# Patient Record
Sex: MALE | Race: WHITE
[De-identification: names, ages, dates, MRNs, and addresses within clinical notes are randomized per-mention and may not be internally consistent; named-entity substitution may affect disease eponyms.]

---

## 2017-03-03 ENCOUNTER — PRIOR ORIGINAL RECORDS (OUTPATIENT)
Dept: OTHER | Age: 62
End: 2017-03-03

## 2017-03-22 ENCOUNTER — MYAURORA ACCOUNT LINK (OUTPATIENT)
Dept: OTHER | Age: 62
End: 2017-03-22

## 2017-03-22 ENCOUNTER — PRIOR ORIGINAL RECORDS (OUTPATIENT)
Dept: OTHER | Age: 62
End: 2017-03-22

## 2017-05-18 PROCEDURE — 81001 URINALYSIS AUTO W/SCOPE: CPT | Performed by: FAMILY MEDICINE

## 2017-05-18 PROCEDURE — 84153 ASSAY OF PSA TOTAL: CPT | Performed by: FAMILY MEDICINE

## 2017-05-22 PROBLEM — M25.60 MORNING STIFFNESS OF JOINTS: Status: ACTIVE | Noted: 2017-05-22

## 2017-05-22 PROBLEM — R73.02 GLUCOSE INTOLERANCE (IMPAIRED GLUCOSE TOLERANCE): Status: ACTIVE | Noted: 2017-05-22

## 2017-05-22 PROCEDURE — 86200 CCP ANTIBODY: CPT | Performed by: FAMILY MEDICINE

## 2017-05-22 PROCEDURE — 86618 LYME DISEASE ANTIBODY: CPT | Performed by: FAMILY MEDICINE

## 2018-07-02 PROBLEM — M25.60 MORNING STIFFNESS OF JOINTS: Status: RESOLVED | Noted: 2017-05-22 | Resolved: 2018-07-02

## 2019-03-01 VITALS
BODY MASS INDEX: 33.6 KG/M2 | WEIGHT: 240 LBS | HEART RATE: 68 BPM | HEIGHT: 71 IN | DIASTOLIC BLOOD PRESSURE: 76 MMHG | SYSTOLIC BLOOD PRESSURE: 130 MMHG

## 2019-09-03 PROCEDURE — 81003 URINALYSIS AUTO W/O SCOPE: CPT | Performed by: FAMILY MEDICINE

## 2020-10-06 ENCOUNTER — APPOINTMENT (OUTPATIENT)
Dept: CT IMAGING | Facility: HOSPITAL | Age: 65
End: 2020-10-06
Attending: EMERGENCY MEDICINE
Payer: MEDICARE

## 2020-10-06 ENCOUNTER — HOSPITAL ENCOUNTER (EMERGENCY)
Facility: HOSPITAL | Age: 65
Discharge: HOME | End: 2020-10-06
Attending: EMERGENCY MEDICINE
Payer: MEDICARE

## 2020-10-06 VITALS
HEART RATE: 99 BPM | TEMPERATURE: 98 F | DIASTOLIC BLOOD PRESSURE: 71 MMHG | RESPIRATION RATE: 25 BRPM | WEIGHT: 247 LBS | OXYGEN SATURATION: 94 % | SYSTOLIC BLOOD PRESSURE: 110 MMHG | BODY MASS INDEX: 35 KG/M2

## 2020-10-06 DIAGNOSIS — R42 DIZZINESS: Primary | ICD-10-CM

## 2020-10-06 DIAGNOSIS — F10.920 ALCOHOLIC INTOXICATION WITHOUT COMPLICATION (HCC): ICD-10-CM

## 2020-10-06 PROCEDURE — 85610 PROTHROMBIN TIME: CPT | Performed by: EMERGENCY MEDICINE

## 2020-10-06 PROCEDURE — 80053 COMPREHEN METABOLIC PANEL: CPT | Performed by: EMERGENCY MEDICINE

## 2020-10-06 PROCEDURE — 96360 HYDRATION IV INFUSION INIT: CPT

## 2020-10-06 PROCEDURE — 85730 THROMBOPLASTIN TIME PARTIAL: CPT | Performed by: EMERGENCY MEDICINE

## 2020-10-06 PROCEDURE — 84484 ASSAY OF TROPONIN QUANT: CPT | Performed by: EMERGENCY MEDICINE

## 2020-10-06 PROCEDURE — 70450 CT HEAD/BRAIN W/O DYE: CPT | Performed by: EMERGENCY MEDICINE

## 2020-10-06 PROCEDURE — 99285 EMERGENCY DEPT VISIT HI MDM: CPT

## 2020-10-06 PROCEDURE — 80320 DRUG SCREEN QUANTALCOHOLS: CPT | Performed by: EMERGENCY MEDICINE

## 2020-10-06 PROCEDURE — 82962 GLUCOSE BLOOD TEST: CPT

## 2020-10-06 PROCEDURE — 93010 ELECTROCARDIOGRAM REPORT: CPT

## 2020-10-06 PROCEDURE — 85025 COMPLETE CBC W/AUTO DIFF WBC: CPT | Performed by: EMERGENCY MEDICINE

## 2020-10-06 PROCEDURE — 93005 ELECTROCARDIOGRAM TRACING: CPT

## 2020-10-07 NOTE — ED NOTES
Patient awake and alert in no distress resting comfortably in the stretcher. Patients SpO2 was observed to range from 88-92% on room air. Patient denies any shortness of breath or worsening of symptoms. Patient placed on 2 liters nasal cannula.  Dr Rocío Harrell

## 2020-10-07 NOTE — ED INITIAL ASSESSMENT (HPI)
Patient relates that while he was outside walking around he had a sudden onset of dizziness without LOC. Denies CP/SOB. Denies numbness weakness. Patient relates he was consuming alcohol prior to going for his walk.

## 2020-10-07 NOTE — ED NOTES
Patient able to ambulate without assistance and without return of symptoms. Patient relates he feels ready to go home.

## 2020-10-07 NOTE — ED PROVIDER NOTES
Patient Seen in: [facility] Emergency Department      History   Patient presents with:  Dizziness    Stated Complaint:     THEODORA    Anshu Whittington is a pleasant 60-year-old male coming with complaints of dizziness.   He states he was walking home from dinner for stated complaint:   Other systems are as noted in HPI. Constitutional and vital signs reviewed. All other systems reviewed and negative except as noted above.     Physical Exam     ED Triage Vitals [10/06/20 2016]   /71   Pulse 111   Resp 20 results for these tests on the individual orders. URINALYSIS WITH CULTURE REFLEX   RAINBOW DRAW BLUE   RAINBOW DRAW LAVENDER   RAINBOW DRAW LIGHT GREEN   RAINBOW DRAW GOLD     EKG    Rate, intervals and axes as noted on EKG Report.   Rate: 107  Rhythm: Si

## 2020-10-07 NOTE — ED NOTES
Patients wife related she feels comfortable taking the patient home and being able to get him into their house upon return home.

## 2021-09-12 PROBLEM — G31.9 DIFFUSE CEREBRAL ATROPHY (HCC): Status: ACTIVE | Noted: 2021-09-12

## 2021-09-12 PROBLEM — E11.9 TYPE 2 DIABETES MELLITUS WITHOUT COMPLICATION, WITHOUT LONG-TERM CURRENT USE OF INSULIN (HCC): Status: ACTIVE | Noted: 2021-09-12

## 2021-09-12 PROBLEM — I77.810 AORTIC ROOT DILATION: Status: ACTIVE | Noted: 2021-09-12

## 2021-09-12 PROBLEM — I77.810 AORTIC ROOT DILATION (HCC): Status: ACTIVE | Noted: 2021-09-12

## 2021-09-12 PROBLEM — G31.9 DIFFUSE CEREBRAL ATROPHY: Status: ACTIVE | Noted: 2021-09-12

## 2021-10-06 PROBLEM — F10.920 ALCOHOLIC INTOXICATION WITHOUT COMPLICATION (HCC): Status: RESOLVED | Noted: 2021-10-06 | Resolved: 2021-10-06

## 2021-10-06 PROBLEM — E11.9 CONTROLLED TYPE 2 DIABETES MELLITUS WITHOUT COMPLICATION, WITHOUT LONG-TERM CURRENT USE OF INSULIN (HCC): Status: ACTIVE | Noted: 2021-09-12

## 2021-10-06 PROBLEM — F10.920 ALCOHOLIC INTOXICATION WITHOUT COMPLICATION: Status: RESOLVED | Noted: 2021-10-06 | Resolved: 2021-10-06

## 2021-10-06 PROBLEM — F10.920 ALCOHOLIC INTOXICATION WITHOUT COMPLICATION (HCC): Status: ACTIVE | Noted: 2021-10-06

## 2021-10-06 PROBLEM — F10.920 ALCOHOLIC INTOXICATION WITHOUT COMPLICATION: Status: ACTIVE | Noted: 2021-10-06

## 2021-10-06 PROBLEM — E66.01 SEVERE OBESITY (BMI 35.0-39.9) WITH COMORBIDITY (HCC): Status: ACTIVE | Noted: 2021-10-06

## 2025-06-18 ENCOUNTER — HBO TECH VISIT (OUTPATIENT)
Dept: WOUND CARE | Facility: HOSPITAL | Age: 70
End: 2025-06-18
Attending: INTERNAL MEDICINE
Payer: MEDICARE

## 2025-06-18 NOTE — PROGRESS NOTES
Pt. Given hyperbaric technical and safety concutation.  Pt. Also given hyperbaric patient education.  Pt. Indicated understanding

## 2025-07-16 ENCOUNTER — HBO THERAPY VISIT (OUTPATIENT)
Dept: WOUND CARE | Facility: HOSPITAL | Age: 70
End: 2025-07-16
Attending: INTERNAL MEDICINE
Payer: MEDICARE

## 2025-07-16 VITALS
RESPIRATION RATE: 12 BRPM | SYSTOLIC BLOOD PRESSURE: 145 MMHG | HEART RATE: 67 BPM | TEMPERATURE: 97 F | DIASTOLIC BLOOD PRESSURE: 82 MMHG

## 2025-07-16 DIAGNOSIS — K62.7 RADIATION PROCTITIS: ICD-10-CM

## 2025-07-16 DIAGNOSIS — E11.9 CONTROLLED TYPE 2 DIABETES MELLITUS WITHOUT COMPLICATION, WITHOUT LONG-TERM CURRENT USE OF INSULIN (HCC): ICD-10-CM

## 2025-07-16 DIAGNOSIS — N30.41 HEMATURIA DUE TO IRRADIATION CYSTITIS: ICD-10-CM

## 2025-07-16 DIAGNOSIS — N30.40 RADIATION CYSTITIS: Primary | ICD-10-CM

## 2025-07-16 LAB — GLUCOSE BLD-MCNC: 147 MG/DL (ref 70–99)

## 2025-07-16 PROCEDURE — 99183 HYPERBARIC OXYGEN THERAPY: CPT | Performed by: NURSE PRACTITIONER

## 2025-07-16 NOTE — PROGRESS NOTES
HBO Treatment Details      Patient Name:    Daryl Arana  MRN:  [id number]        YOB: 1955  Today's Date:  7/16/2025  Physician/Provider: YVON Holloway  Facility: [facility]  Diagnosis:   1. Radiation cystitis    2. 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 Controlled type 2 diabetes mellitus without complication, without long-term current use of insulin (HCC)            Treatment Course Number: 1  Total Treatments Ordered:  45  Indication: Soft tissue radionecrosis  Risk Factors: Seizure, Barotrauma  HBO Treatment Start Date: 07/16/25  HBO Treatment Number:  1  Treatment Length:120 Minutes  Treatment Depth: 2.5 DONATO  HBO Treatment End Date:   HBO Discharge Outcome: Treatment Complete        HBO Treatment Details:  In-Patient Visit: no  Chamber Type:  Hard sided Mono place chamber  Chamber #: 1  Treatment Protocol:  2.5 DONATO with 10 min air break      Treatment Details:  Pressurized Rate: 2.0 psi/min  Dive Rate Down:  1 psi/min, increased to 2.0 psi/min as tolerated  Dive Rate Up:  2.0 psi/min  Started Compression: 0721  Reached Compression: 0736  Patient on Air: 0821  Patient Taken off Air:0831  Total Compression Time: 15 (Minutes)  Total Holding Time: 100 (Minutes)  Started Decompression: 0916  Reached Surface: 0736  Total Decompression Time: 10 (Minutes)  Total Airbreaks: 10 (Minutes)  Total Time of Treatment: 115 (Minutes)                         Vital Signs:  HBO Glucose:  - (Reference Range: 100-350 mg/dl)   Pre-dive-283  Post Dive-147     Vitals:    07/16/25 0715 07/16/25 0939   BP: 147/79 145/82   Pulse: 89 67   Resp: 12 12   Temp: 97.9 °F (36.6 °C) 97 °F (36.1 °C)       Treatment Response:  Symptoms Noted During Treatment: None   Treatment Aborted:  No      Treatment Notes:  Pt treated in hard sided monoplace chamber. Today was pt's first treatment. Pt tolerated well, no immediate complications.

## 2025-07-16 NOTE — PROGRESS NOTES
Hyperbaric Daily Physician Note       Patient is here for Hyperbaric Oxygen therapy.     VITALS:      7/16/2025     7:15 AM 7/16/2025     9:39 AM   Vitals History   /79 145/82   Pulse 89 67   Resp 12 12   Temp 97.9 °F (36.6 °C) 97 °F (36.1 °C)       Lab Results   Component Value Date    PGLU 147 (H) 07/16/2025    PGLU 123 (H) 10/06/2020     Vitals:  See HBO tech note/flowsheets for vitals pre/post    DIAGNOSIS:   1. Radiation cystitis    2. 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 Controlled type 2 diabetes mellitus without complication, without long-term current use of insulin (HCC)        Pre/Post Treatment Evaluation      Pre-Treatment Lung Evaluation: Clear to auscultation bilaterally    Left Ear Pre-Treatment Evaluation  Left Ear Clear: Yes  Left Ear Intact: Yes  Left Cerumen Removal: No  Pre Left teed stgstrstastdstest:st st1st grade    Right Ear Pre-Treatment Evaluation  Right Ear Clear: Yes  Right Ear Intact: Yes  Right Cerumen Removal: No  Pre Right teed stgstrstastdstest:st st1st grade      Post-Treatment Lung Evaluation: Clear to auscultation bilaterally    Left Ear Post-Treatment Evaluation  Left Ear Clear: Yes  Left Ear Intact: Yes  Left Cerumen Removal: No  Post Left teed stgstrstastdstest:st st1st grade    Right Ear Post-Treatment Evaluation  Right Ear Clear: Yes  Right Ear Intact: Yes  Right Cerumen Removal: No  Post Right teed stgstrstastdstest:st st1st grade      Dive #1, patient tolerated well.       I supervised this Hyperbaric treatment and was immediately available throughout the course of the treatment with a hospital approved hyperbaric physician available by phone.  In addition, there is a trained emergency support team and ICU available at this facility to assist with complications.         Dipti Brownlee, APRN  7/16/2025  12:33 PM

## 2025-07-17 ENCOUNTER — HBO THERAPY VISIT (OUTPATIENT)
Dept: WOUND CARE | Facility: HOSPITAL | Age: 70
End: 2025-07-17
Attending: INTERNAL MEDICINE
Payer: MEDICARE

## 2025-07-17 VITALS
RESPIRATION RATE: 12 BRPM | TEMPERATURE: 97 F | DIASTOLIC BLOOD PRESSURE: 99 MMHG | HEART RATE: 76 BPM | SYSTOLIC BLOOD PRESSURE: 150 MMHG

## 2025-07-17 DIAGNOSIS — N30.41 HEMATURIA DUE TO IRRADIATION CYSTITIS: ICD-10-CM

## 2025-07-17 DIAGNOSIS — K62.7 RADIATION PROCTITIS: ICD-10-CM

## 2025-07-17 DIAGNOSIS — N30.40 RADIATION CYSTITIS: Primary | ICD-10-CM

## 2025-07-17 DIAGNOSIS — E11.9 CONTROLLED TYPE 2 DIABETES MELLITUS WITHOUT COMPLICATION, WITHOUT LONG-TERM CURRENT USE OF INSULIN (HCC): ICD-10-CM

## 2025-07-17 LAB
GLUCOSE BLD-MCNC: 178 MG/DL (ref 70–99)
GLUCOSE BLD-MCNC: 282 MG/DL (ref 70–99)

## 2025-07-17 PROCEDURE — 99183 HYPERBARIC OXYGEN THERAPY: CPT | Performed by: NURSE PRACTITIONER

## 2025-07-17 NOTE — PROGRESS NOTES
HBO Treatment Details      Patient Name:    Daryl Arana  MRN:  [id number]        YOB: 1955  Today's Date:  7/17/2025  Physician/Provider: YVON Holloway  Facility: [facility]  Diagnosis:   1. Radiation cystitis    2. 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 Controlled type 2 diabetes mellitus without complication, without long-term current use of insulin (HCC)            Treatment Course Number: 2  Total Treatments Ordered:  45    Indication: Soft tissue radionecrosis  Risk Factors: Barotrauma, Seizure  HBO Treatment Start Date: 7/16/25  HBO Treatment Number:  2  Treatment Length:120 Minutes  Treatment Depth: 2.5 DONATO  HBO Discharge Outcome: No Change        HBO Treatment Details:  In-Patient Visit: no  Chamber Type:  hard sided monoplace  Chamber #: 1  Treatment Protocol:  2.5 DONATO for 90 minutes with 10 minute air break      Treatment Details:  Pressurized Rate: 2.0 psi/min  Dive Rate Down:  2.0 psi  Dive Rate Up:  2.0 psi  Started Compression: 0709  Reached Compression: 0719  Patient on Air: 0804  Patient Taken off Air:0814  Total Compression Time: 10 (Minutes)  Total Holding Time: 100 (Minutes)  Started Decompression: 0859  Reached Surface: 0719  Total Decompression Time: 10 (Minutes)  Total Airbreaks: 10 (Minutes)  Total Time of Treatment: 110 (Minutes)                         Vital Signs:  HBO Glucose: 282 - 178 (Reference Range: 100-350 mg/dl)        Vitals:    07/17/25 0700 07/17/25 0935   BP: 146/78 (!) 150/99   Pulse: 94 76   Resp: 12 12   Temp: 97 °F (36.1 °C) 97 °F (36.1 °C)       Treatment Response:  Symptoms Noted During Treatment: None   Treatment Aborted:  no

## 2025-07-17 NOTE — PROGRESS NOTES
Hyperbaric Daily Physician Note       Patient is here for Hyperbaric Oxygen therapy.     VITALS:      7/17/2025     7:00 AM 7/17/2025     9:35 AM   Vitals History   /78 150/99   Pulse 94 76   Resp 12 12   Temp 97 °F (36.1 °C) 97 °F (36.1 °C)       Lab Results   Component Value Date    PGLU 282 (H) 07/17/2025    PGLU 147 (H) 07/16/2025    PGLU 123 (H) 10/06/2020     Vitals:  See HBO tech note/flowsheets for vitals pre/post    DIAGNOSIS:   1. Radiation cystitis    2. 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 Controlled type 2 diabetes mellitus without complication, without long-term current use of insulin (HCC)        Pre/Post Treatment Evaluation      Pre-Treatment Lung Evaluation: Clear to auscultation bilaterally    Left Ear Pre-Treatment Evaluation  Left Ear Clear: Yes  Left Ear Intact: Yes  Left Cerumen Removal: No  Pre Left teed stgstrstastdstest:st st1st grade    Right Ear Pre-Treatment Evaluation  Right Ear Clear: Yes  Right Ear Intact: Yes  Right Cerumen Removal: No  Pre Right teed stgstrstastdstest:st st1st grade      Post-Treatment Lung Evaluation: Clear to auscultation bilaterally    Left Ear Post-Treatment Evaluation  Left Ear Clear: Yes  Left Ear Intact: Yes  Left Cerumen Removal: No  Post Left teed stgstrstastdstest:st st1st grade    Right Ear Post-Treatment Evaluation  Right Ear Clear: Yes  Right Ear Intact: Yes  Right Cerumen Removal: No  Post Right teed stgstrstastdstest:st st1st grade      I supervised this Hyperbaric treatment and was immediately available throughout the course of the treatment with a hospital approved hyperbaric physician available by phone.  In addition, there is a trained emergency support team and ICU available at this facility to assist with complications.         Dipti Brownlee, YVON  7/17/2025  9:26 AM

## 2025-07-18 ENCOUNTER — HBO THERAPY VISIT (OUTPATIENT)
Dept: WOUND CARE | Facility: HOSPITAL | Age: 70
End: 2025-07-18
Attending: INTERNAL MEDICINE
Payer: MEDICARE

## 2025-07-18 VITALS
TEMPERATURE: 98 F | HEART RATE: 88 BPM | DIASTOLIC BLOOD PRESSURE: 80 MMHG | SYSTOLIC BLOOD PRESSURE: 142 MMHG | RESPIRATION RATE: 12 BRPM

## 2025-07-18 DIAGNOSIS — K62.7 RADIATION PROCTITIS: ICD-10-CM

## 2025-07-18 DIAGNOSIS — N30.40 RADIATION CYSTITIS: Primary | ICD-10-CM

## 2025-07-18 DIAGNOSIS — E11.9 CONTROLLED TYPE 2 DIABETES MELLITUS WITHOUT COMPLICATION, WITHOUT LONG-TERM CURRENT USE OF INSULIN (HCC): ICD-10-CM

## 2025-07-18 DIAGNOSIS — N30.41 HEMATURIA DUE TO IRRADIATION CYSTITIS: ICD-10-CM

## 2025-07-18 LAB — GLUCOSE BLD-MCNC: 286 MG/DL (ref 70–99)

## 2025-07-18 PROCEDURE — 99183 HYPERBARIC OXYGEN THERAPY: CPT | Performed by: NURSE PRACTITIONER

## 2025-07-18 NOTE — PROGRESS NOTES
Hyperbaric Daily Physician Note       Patient is here for Hyperbaric Oxygen therapy.     VITALS:      7/17/2025     9:35 AM 7/18/2025     7:00 AM   Vitals History   /99 144/80   Pulse 76 66   Resp 12 12   Temp 97 °F (36.1 °C) 97.8 °F (36.6 °C)     Lab Results   Component Value Date    PGLU 178 (H) 07/17/2025    PGLU 282 (H) 07/17/2025    PGLU 147 (H) 07/16/2025    PGLU 123 (H) 10/06/2020     Vitals:  See HBO tech note/flowsheets for vitals pre/post    DIAGNOSIS:   1. Radiation cystitis    2. 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 Controlled type 2 diabetes mellitus without complication, without long-term current use of insulin (HCC)        Pre/Post Treatment Evaluation      Pre-Treatment Lung Evaluation: Clear to auscultation bilaterally    Left Ear Pre-Treatment Evaluation  Left Ear Clear: Yes  Left Ear Intact: Yes  Left Cerumen Removal: No  Pre Left teed stgstrstastdstest:st st1st grade    Right Ear Pre-Treatment Evaluation  Right Ear Clear: Yes  Right Ear Intact: Yes  Right Cerumen Removal: No  Pre Right teed stgstrstastdstest:st st1st grade      Post-Treatment Lung Evaluation: Clear to auscultation bilaterally    Left Ear Post-Treatment Evaluation  Left Ear Clear: Yes  Left Ear Intact: Yes  Left Cerumen Removal: No  Post Left teed stgstrstastdstest:st st1st grade    Right Ear Post-Treatment Evaluation  Right Ear Clear: Yes  Right Ear Intact: Yes  Right Cerumen Removal: No  Post Right teed stgstrstastdstest:st st1st grade      I supervised this Hyperbaric treatment and was immediately available throughout the course of the treatment with a hospital approved hyperbaric physician available by phone.  In addition, there is a trained emergency support team and ICU available at this facility to assist with complications.         Dipti Brownlee, YVON  7/18/2025  9:50 AM

## 2025-07-18 NOTE — PROGRESS NOTES
HBO Treatment Details      Patient Name:    Daryl Arana  MRN:  [id number]        YOB: 1955  Today's Date:  2025  Physician/Provider: YVON Holloway  Facility: [facility]  Diagnosis:   1. Radiation cystitis    2. 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 Controlled type 2 diabetes mellitus without complication, without long-term current use of insulin (HCC)            Treatment Course Number: 3  Total Treatments Ordered:  45    Indication: Soft tissue radionecrosis  Risk Factors: Barotrauma, Seizure  HBO Treatment Start Date: 2025  HBO Treatment Number:  3  Treatment Length:120 Minutes  Treatment Depth: 2.5 DONATO        HBO Treatment Details:  In-Patient Visit: no  Chamber Type:  hard sided monoplace  Chamber #: 2  HBO Dive Lo  Treatment Protocol:  2.5 DONATO with 10 minute air break      Treatment Details:  Pressurized Rate: 2.0 psi/min  Dive Rate Down:  2.0 psi  Dive Rate Up:  2.0 psi  Started Compression: 0710  Reached Compression: 0720  Patient on Air: 0805  Patient Taken off Air:0815  Total Compression Time: 10 (Minutes)  Total Holding Time: 100 (Minutes)  Started Decompression: 0900  Reached Surface: 0720  Total Decompression Time: 10 (Minutes)  Total Airbreaks: 10 (Minutes)  Total Time of Treatment: 110 (Minutes)                         Vital Signs:  HBO Glucose: 286 - 148 (Reference Range: 100-350 mg/dl)        Vitals:    25 0700 25 0915   BP: 144/80 142/80   Pulse: 66 88   Resp: 12 12   Temp: 97.8 °F (36.6 °C) 97.7 °F (36.5 °C)       Treatment Response:  Symptoms Noted During Treatment: None   Treatment Aborted:  no

## 2025-07-21 ENCOUNTER — HBO THERAPY VISIT (OUTPATIENT)
Dept: WOUND CARE | Facility: HOSPITAL | Age: 70
End: 2025-07-21
Attending: INTERNAL MEDICINE
Payer: MEDICARE

## 2025-07-21 VITALS
DIASTOLIC BLOOD PRESSURE: 79 MMHG | SYSTOLIC BLOOD PRESSURE: 151 MMHG | HEART RATE: 68 BPM | RESPIRATION RATE: 14 BRPM | TEMPERATURE: 98 F

## 2025-07-21 DIAGNOSIS — K62.7 RADIATION PROCTITIS: ICD-10-CM

## 2025-07-21 DIAGNOSIS — N30.41 HEMATURIA DUE TO IRRADIATION CYSTITIS: ICD-10-CM

## 2025-07-21 DIAGNOSIS — N30.40 RADIATION CYSTITIS: Primary | ICD-10-CM

## 2025-07-21 DIAGNOSIS — E11.9 CONTROLLED TYPE 2 DIABETES MELLITUS WITHOUT COMPLICATION, WITHOUT LONG-TERM CURRENT USE OF INSULIN (HCC): ICD-10-CM

## 2025-07-21 LAB
GLUCOSE BLD-MCNC: 280 MG/DL (ref 70–99)
GLUCOSE BLD-MCNC: 283 MG/DL (ref 70–99)

## 2025-07-21 PROCEDURE — 82962 GLUCOSE BLOOD TEST: CPT | Performed by: INTERNAL MEDICINE

## 2025-07-21 NOTE — PROGRESS NOTES
HBO Treatment Details      Patient Name:    Daryl Arana  MRN:  [id number]        YOB: 1955  Today's Date:  2025  Physician/Provider: Marie Delgado MD  Facility: [facility]  Diagnosis: No diagnosis found.        Treatment Course Number: 4  Total Treatments Ordered:  45  Ordering Physician:   Indication: Soft tissue radionecrosis  Risk Factors: Seizure, Barotrauma  HBO Treatment Start Date:   HBO Treatment Number:  4  Treatment Length:120 Minutes  Treatment Depth: 2.5 DONATO  HBO Treatment End Date:   HBO Discharge Outcome: Treatment Complete        HBO Treatment Details:  In-Patient Visit: no  Chamber Type:  Hard sided monoplace  Chamber #: 2  HBO Dive Lo  Treatment Protocol:  2.5 DONATO with 10 min air break      Treatment Details:  Pressurized Rate: 2.0 psi/min  Dive Rate Down:  2.0 psi/min  Dive Rate Up:  2.0 psi/min  Started Compression: 0736  Reached Compression: 0746  Patient on Air: 0831  Patient Taken off Air:0841  Total Compression Time: 10 (Minutes)  Total Holding Time: 100 (Minutes)  Started Decompression: 0926  Reached Surface: 0746  Total Decompression Time: 10 (Minutes)  Total Airbreaks: 10 (Minutes)  Total Time of Treatment: 110 (Minutes)                         Vital Signs:  HBO Glucose: Pre 280 - (Reference Range: 100-350 mg/dl)        Vitals:    25 0732 25 1055   BP: 149/75 151/79   Pulse: 98 68   Resp: 12 14   Temp: 97.9 °F (36.6 °C) 97.9 °F (36.6 °C)       Treatment Response:  Symptoms Noted During Treatment: None   Treatment Aborted:  No      Treatment Notes:

## 2025-07-21 NOTE — PROGRESS NOTES
Hyperbaric Daily Physician Note       Patient is here for Hyperbaric Oxygen therapy.   No complaints today.   Tolerating HBO with no side effects.     Blood Sugar:  Within normal limits.  Lab Results   Component Value Date    PGLU 280 (H) 07/21/2025    PGLU 286 (H) 07/18/2025    PGLU 178 (H) 07/17/2025    PGLU 282 (H) 07/17/2025       Vitals:      7/21/2025     7:32 AM 7/21/2025    10:55 AM   Vitals History   /75 151/79   Pulse 98 68   Resp 12 14   Temp 97.9 °F (36.6 °C) 97.9 °F (36.6 °C)        DIAGNOSIS:       ICD-10-CM    1. Radiation cystitis  N30.40       2. Radiation proctitis  K62.7       3. 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 Controlled type 2 diabetes mellitus without complication, without long-term current use of insulin (HCC)  E11.9           Pre/Post Treatment Evaluation      Pre-Treatment Lung Evaluation: Clear to auscultation bilaterally    Left Ear Pre-Treatment Evaluation  Left Ear Clear: Yes  Left Ear Intact: Yes  Left Cerumen Removal: No  Pre Left teed stgstrstastdstest:st st1st grade    Right Ear Pre-Treatment Evaluation  Right Ear Clear: Yes  Right Ear Intact: Yes  Right Cerumen Removal: No  Pre Right teed stgstrstastdstest:st st1st grade      Post-Treatment Lung Evaluation: Clear to auscultation bilaterally    Left Ear Post-Treatment Evaluation  Left Ear Clear: Yes  Left Ear Intact: Yes  Left Cerumen Removal: No  Post Left teed stgstrstastdstest:st st1st grade    Right Ear Post-Treatment Evaluation  Right Ear Clear: Yes  Right Ear Intact: Yes  Right Cerumen Removal: No  Post Right teed stgstrstastdstest:st st1st grade      I supervised this Hyperbaric treatment and was immediately available throughout the course of the treatment.  There is a trained emergency support team and ICU available at this facility to assist with complications.      Return for HBO treatments as scheduled.      Marie Delgado MD  7/21/2025  5:22 PM

## 2025-07-22 ENCOUNTER — HBO THERAPY VISIT (OUTPATIENT)
Dept: WOUND CARE | Facility: HOSPITAL | Age: 70
End: 2025-07-22
Attending: INTERNAL MEDICINE
Payer: MEDICARE

## 2025-07-22 VITALS
SYSTOLIC BLOOD PRESSURE: 136 MMHG | TEMPERATURE: 98 F | HEART RATE: 84 BPM | RESPIRATION RATE: 12 BRPM | DIASTOLIC BLOOD PRESSURE: 82 MMHG

## 2025-07-22 DIAGNOSIS — E11.9 CONTROLLED TYPE 2 DIABETES MELLITUS WITHOUT COMPLICATION, WITHOUT LONG-TERM CURRENT USE OF INSULIN (HCC): ICD-10-CM

## 2025-07-22 DIAGNOSIS — N30.41 HEMATURIA DUE TO IRRADIATION CYSTITIS: ICD-10-CM

## 2025-07-22 DIAGNOSIS — K62.7 RADIATION PROCTITIS: ICD-10-CM

## 2025-07-22 DIAGNOSIS — N30.40 RADIATION CYSTITIS: Primary | ICD-10-CM

## 2025-07-22 LAB
GLUCOSE BLD-MCNC: 147 MG/DL (ref 70–99)
GLUCOSE BLD-MCNC: 278 MG/DL (ref 70–99)

## 2025-07-22 PROCEDURE — 99183 HYPERBARIC OXYGEN THERAPY: CPT | Performed by: NURSE PRACTITIONER

## 2025-07-22 NOTE — PROGRESS NOTES
HBO Treatment Details      Patient Name:    Daryl Arana  MRN:  [id number]        YOB: 1955  Today's Date:  2025  Physician/Provider: YVON Holloway  Facility: [facility]  Diagnosis:   1. Radiation cystitis    2. 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 Controlled type 2 diabetes mellitus without complication, without long-term current use of insulin (HCC)            Treatment Course Number: 5  Total Treatments Ordered:  45  Indication: Soft tissue radionecrosis  Risk Factors: Barotrauma, Seizure  HBO Treatment Start Date: 25  HBO Treatment Number:  5  Treatment Length:120 Minutes  Treatment Depth: 2.5 DONATO  HBO Discharge Outcome: No Change        HBO Treatment Details:  In-Patient Visit: no  Chamber Type:  hard sided monoplace  Chamber #: 1  HBO Dive Lo  Treatment Protocol:  2.5 DONATO for 90 minutes with 10 minute air break      Treatment Details:  Pressurized Rate: 2.0 psi/min  Dive Rate Down:  2.0 psi  Dive Rate Up:  2.0 psi  Started Compression: 0710  Reached Compression: 0720  Patient on Air: 0805  Patient Taken off Air:0815  Total Compression Time: 10 (Minutes)  Total Holding Time: 100 (Minutes)  Started Decompression: 0900  Reached Surface: 0720  Total Decompression Time: 10 (Minutes)  Total Airbreaks: 10 (Minutes)  Total Time of Treatment: 110 (Minutes)                         Vital Signs:  HBO Glucose: 278 - 147 (Reference Range: 100-350 mg/dl)        Vitals:    25 0700 25 0915   BP: 144/78 136/82   Pulse: 92 84   Resp: 12 12   Temp: 97.7 °F (36.5 °C) 97.6 °F (36.4 °C)       Treatment Response:  Symptoms Noted During Treatment: None   Treatment Aborted:  no

## 2025-07-22 NOTE — PROGRESS NOTES
Hyperbaric Daily Physician Note       Patient is here for Hyperbaric Oxygen therapy.     VITALS:      7/21/2025     7:32 AM 7/21/2025    10:55 AM   Vitals History   /75 151/79   Pulse 98 68   Resp 12 14   Temp 97.9 °F (36.6 °C) 97.9 °F (36.6 °C)       Lab Results   Component Value Date    PGLU 147 (H) 07/22/2025    PGLU 278 (H) 07/22/2025    PGLU 280 (H) 07/21/2025    PGLU 286 (H) 07/18/2025     Vitals:  See HBO tech note/flowsheets for vitals pre/post    DIAGNOSIS:   1. Radiation cystitis    2. 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 Controlled type 2 diabetes mellitus without complication, without long-term current use of insulin (HCC)        Pre/Post Treatment Evaluation      Pre-Treatment Lung Evaluation: Clear to auscultation bilaterally    Left Ear Pre-Treatment Evaluation  Left Ear Clear: Yes  Left Ear Intact: Yes  Left Cerumen Removal: No  Pre Left teed stgstrstastdstest:st st1st grade    Right Ear Pre-Treatment Evaluation  Right Ear Clear: Yes  Right Ear Intact: Yes  Right Cerumen Removal: No  Pre Right teed stgstrstastdstest:st st1st grade      Post-Treatment Lung Evaluation: Clear to auscultation bilaterally    Left Ear Post-Treatment Evaluation  Left Ear Clear: Yes  Left Ear Intact: Yes  Left Cerumen Removal: No  Post Left teed stgstrstastdstest:st st1st grade    Right Ear Post-Treatment Evaluation  Right Ear Clear: Yes  Right Ear Intact: Yes  Right Cerumen Removal: No  Post Right teed stgstrstastdstest:st st1st grade      I supervised this Hyperbaric treatment and was immediately available throughout the course of the treatment with a hospital approved hyperbaric physician available by phone.  In addition, there is a trained emergency support team and ICU available at this facility to assist with complications.         Dipti Brownlee, YVON  7/22/2025  9:20 AM

## 2025-07-23 ENCOUNTER — HBO THERAPY VISIT (OUTPATIENT)
Dept: WOUND CARE | Facility: HOSPITAL | Age: 70
End: 2025-07-23
Attending: INTERNAL MEDICINE
Payer: MEDICARE

## 2025-07-23 VITALS
DIASTOLIC BLOOD PRESSURE: 78 MMHG | RESPIRATION RATE: 12 BRPM | SYSTOLIC BLOOD PRESSURE: 148 MMHG | HEART RATE: 69 BPM | TEMPERATURE: 98 F

## 2025-07-23 DIAGNOSIS — K62.7 RADIATION PROCTITIS: ICD-10-CM

## 2025-07-23 DIAGNOSIS — E11.9 CONTROLLED TYPE 2 DIABETES MELLITUS WITHOUT COMPLICATION, WITHOUT LONG-TERM CURRENT USE OF INSULIN (HCC): ICD-10-CM

## 2025-07-23 DIAGNOSIS — N30.41 HEMATURIA DUE TO IRRADIATION CYSTITIS: ICD-10-CM

## 2025-07-23 DIAGNOSIS — N30.40 RADIATION CYSTITIS: Primary | ICD-10-CM

## 2025-07-23 LAB
GLUCOSE BLD-MCNC: 158 MG/DL (ref 70–99)
GLUCOSE BLD-MCNC: 263 MG/DL (ref 70–99)

## 2025-07-23 PROCEDURE — 99183 HYPERBARIC OXYGEN THERAPY: CPT | Performed by: NURSE PRACTITIONER

## 2025-07-23 NOTE — PROGRESS NOTES
Hyperbaric Daily Physician Note       Patient is here for Hyperbaric Oxygen therapy.     VITALS:      7/22/2025     9:15 AM 7/23/2025     7:00 AM   Vitals History   /82 146/77   Pulse 84 89   Resp 12 10   Temp 97.6 °F (36.4 °C) 97.7 °F (36.5 °C)       Lab Results   Component Value Date    PGLU 158 (H) 07/23/2025    PGLU 263 (H) 07/23/2025    PGLU 147 (H) 07/22/2025    PGLU 278 (H) 07/22/2025     Vitals:  See HBO tech note/flowsheets for vitals pre/post    DIAGNOSIS:   1. Radiation cystitis    2. 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 Controlled type 2 diabetes mellitus without complication, without long-term current use of insulin (HCC)        Pre/Post Treatment Evaluation      Pre-Treatment Lung Evaluation: Clear to auscultation bilaterally    Left Ear Pre-Treatment Evaluation  Left Ear Clear: Yes  Left Ear Intact: Yes  Left Cerumen Removal: No  Pre Left teed stgstrstastdstest:st st1st grade    Right Ear Pre-Treatment Evaluation  Right Ear Clear: Yes  Right Ear Intact: Yes  Right Cerumen Removal: No  Pre Right teed stgstrstastdstest:st st1st grade      Post-Treatment Lung Evaluation: Clear to auscultation bilaterally    Left Ear Post-Treatment Evaluation  Left Ear Clear: Yes  Left Ear Intact: Yes  Left Cerumen Removal: No  Post Left teed stgstrstastdstest:st st1st grade    Right Ear Post-Treatment Evaluation  Right Ear Clear: Yes  Right Ear Intact: Yes  Right Cerumen Removal: No  Post Right teed stgstrstastdstest:st st1st grade      I supervised this Hyperbaric treatment and was immediately available throughout the course of the treatment with a hospital approved hyperbaric physician available by phone.  In addition, there is a trained emergency support team and ICU available at this facility to assist with complications.         Dipti Brownlee, YVON  7/23/2025  9:52 AM

## 2025-07-23 NOTE — PROGRESS NOTES
HBO Treatment Details      Patient Name:    Daryl Arana  MRN:  [id number]        YOB: 1955  Today's Date:  2025  Physician/Provider: YVON Holloway  Facility: [facility]  Diagnosis:   1. Radiation cystitis    2. 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 Controlled type 2 diabetes mellitus without complication, without long-term current use of insulin (HCC)            Treatment Course Number: 6  Total Treatments Ordered:  45  Ordering Physician:  Indication: Soft tissue radionecrosis  Risk Factors: Barotrauma, Seizure  HBO Treatment Start Date:   HBO Treatment Number:  6  Treatment Length:120 Minutes  Treatment Depth: 2.5 DONATO  HBO Treatment End Date:   HBO Discharge Outcome: Treatment Complete        HBO Treatment Details:  In-Patient Visit: no  Chamber Type:  Hard sided monoplace chamber  Chamber #: 1  HBO Dive Lo  Treatment Protocol:  2.5 DONATO with 10 min air break      Treatment Details:  Pressurized Rate: 2.0 psi/min  Dive Rate Down:  2.0 PSI/min  Dive Rate Up:  2.0PSI/min  Started Compression: 0704  Reached Compression: 0714  Patient on Air: 0809  Patient Taken off Air:0819  Total Compression Time: 10 (Minutes)  Total Holding Time: 110 (Minutes)  Started Decompression: 0904  Reached Surface: 0714  Total Decompression Time: 10 (Minutes)  Total Airbreaks: 10 (Minutes)  Total Time of Treatment: 120 (Minutes)                         Vital Signs:  HBO Glucose:  - (Reference Range: 100-350 mg/dl)   Pre- 263  Post- 158     Vitals:    25 0700 25 0910   BP: 146/77 148/78   Pulse: 89 69   Resp: 10 12   Temp: 97.7 °F (36.5 °C) 98 °F (36.7 °C)       Treatment Response:  Symptoms Noted During Treatment: None   Treatment Aborted:  No      Treatment Notes:  Pt treated in a hard sided monoplace chamber.

## 2025-07-24 ENCOUNTER — HBO THERAPY VISIT (OUTPATIENT)
Dept: WOUND CARE | Facility: HOSPITAL | Age: 70
End: 2025-07-24
Attending: INTERNAL MEDICINE
Payer: MEDICARE

## 2025-07-24 VITALS
RESPIRATION RATE: 12 BRPM | HEART RATE: 80 BPM | DIASTOLIC BLOOD PRESSURE: 77 MMHG | SYSTOLIC BLOOD PRESSURE: 174 MMHG | TEMPERATURE: 98 F

## 2025-07-24 DIAGNOSIS — K62.7 RADIATION PROCTITIS: ICD-10-CM

## 2025-07-24 DIAGNOSIS — E11.9 CONTROLLED TYPE 2 DIABETES MELLITUS WITHOUT COMPLICATION, WITHOUT LONG-TERM CURRENT USE OF INSULIN (HCC): ICD-10-CM

## 2025-07-24 DIAGNOSIS — N30.40 RADIATION CYSTITIS: Primary | ICD-10-CM

## 2025-07-24 DIAGNOSIS — N30.41 HEMATURIA DUE TO IRRADIATION CYSTITIS: ICD-10-CM

## 2025-07-24 LAB — GLUCOSE BLD-MCNC: 163 MG/DL (ref 70–99)

## 2025-07-24 PROCEDURE — 99183 HYPERBARIC OXYGEN THERAPY: CPT | Performed by: NURSE PRACTITIONER

## 2025-07-24 NOTE — PROGRESS NOTES
HBO Treatment Details      Patient Name:    Daryl Arana  MRN:  [id number]        YOB: 1955  Today's Date:  2025  Physician/Provider: YVON Holloway  Facility: [facility]  Diagnosis: No diagnosis found.        Treatment Course Number: 7  Total Treatments Ordered:  45  Ordering Physician:   Indication: Soft tissue radionecrosis  Risk Factors: Barotrauma, Seizure, Hypoglycemia  HBO Treatment Start Date:   HBO Treatment Number:  7  Treatment Length:120 Minutes  Treatment Depth: 2.5 DONATO  HBO Treatment End Date:   HBO Discharge Outcome: Treatment Complete        HBO Treatment Details:  In-Patient Visit: no  Chamber Type:  Monoplace  Chamber #: 1  HBO Dive Lo  Treatment Protocol:  2.5 DONATO with 10 minute air break      Treatment Details:  Pressurized Rate: 2.0 psi/min  Dive Rate Down:  2.0 psi/min  Dive Rate Up:  2.0 psi/min  Started Compression: 0703  Reached Compression: 0713  Patient on Air: 0758  Patient Taken off Air:0808  Total Compression Time: 10 (Minutes)  Total Holding Time: 100 (Minutes)  Started Decompression: 0853  Reached Surface: 0713  Total Decompression Time: 10 (Minutes)  Total Airbreaks: 10 (Minutes)  Total Time of Treatment: 110 (Minutes)                         Vital Signs:  HBO Glucose: pre-284, post-163 - (Reference Range: 100-350 mg/dl)        Vitals:    25 0700 25 0907   BP: 136/77 (!) 174/77   Pulse: 88 80   Resp: 12 12   Temp: 98.3 °F (36.8 °C) 97.5 °F (36.4 °C)       Treatment Response:  Symptoms Noted During Treatment: None   Treatment Aborted:  no      Treatment Notes:  Patient treated in hard sided chamber.

## 2025-07-24 NOTE — PROGRESS NOTES
Hyperbaric Daily Physician Note       Patient is here for Hyperbaric Oxygen therapy.     VITALS:      7/24/2025     7:00 AM 7/24/2025     9:07 AM   Vitals History   /77 174/77   Pulse 88 80   Resp 12 12   Temp 98.3 °F (36.8 °C) 97.5 °F (36.4 °C)       Lab Results   Component Value Date    PGLU 163 (H) 07/24/2025    PGLU 158 (H) 07/23/2025    PGLU 263 (H) 07/23/2025    PGLU 147 (H) 07/22/2025     Vitals:  See HBO tech note/flowsheets for vitals pre/post    DIAGNOSIS:   1. Radiation cystitis    2. 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 Controlled type 2 diabetes mellitus without complication, without long-term current use of insulin (HCC)        Pre/Post Treatment Evaluation      Pre-Treatment Lung Evaluation: Clear to auscultation bilaterally    Left Ear Pre-Treatment Evaluation  Left Ear Clear: Yes  Left Ear Intact: Yes  Left Cerumen Removal: No  Pre Left teed stgstrstastdstest:st st1st grade    Right Ear Pre-Treatment Evaluation  Right Ear Clear: Yes  Right Ear Intact: Yes  Right Cerumen Removal: No  Pre Right teed stgstrstastdstest:st st1st grade      Post-Treatment Lung Evaluation: Clear to auscultation bilaterally    Left Ear Post-Treatment Evaluation  Left Ear Clear: Yes  Left Ear Intact: Yes  Left Cerumen Removal: No  Post Left teed stgstrstastdstest:st st1st grade    Right Ear Post-Treatment Evaluation  Right Ear Clear: Yes  Right Ear Intact: Yes  Right Cerumen Removal: No  Post Right teed stgstrstastdstest:st st1st grade      I supervised this Hyperbaric treatment and was immediately available throughout the course of the treatment with a hospital approved hyperbaric physician available by phone.  In addition, there is a trained emergency support team and ICU available at this facility to assist with complications.         Dipti Brownlee, YVON  7/24/2025  12:57 PM

## 2025-07-25 ENCOUNTER — HBO THERAPY VISIT (OUTPATIENT)
Dept: WOUND CARE | Facility: HOSPITAL | Age: 70
End: 2025-07-25
Attending: INTERNAL MEDICINE
Payer: MEDICARE

## 2025-07-25 VITALS
TEMPERATURE: 97 F | DIASTOLIC BLOOD PRESSURE: 81 MMHG | RESPIRATION RATE: 14 BRPM | HEART RATE: 64 BPM | SYSTOLIC BLOOD PRESSURE: 166 MMHG

## 2025-07-25 DIAGNOSIS — E11.9 CONTROLLED TYPE 2 DIABETES MELLITUS WITHOUT COMPLICATION, WITHOUT LONG-TERM CURRENT USE OF INSULIN (HCC): ICD-10-CM

## 2025-07-25 DIAGNOSIS — N30.41 HEMATURIA DUE TO IRRADIATION CYSTITIS: ICD-10-CM

## 2025-07-25 DIAGNOSIS — K62.7 RADIATION PROCTITIS: ICD-10-CM

## 2025-07-25 DIAGNOSIS — N30.40 RADIATION CYSTITIS: Primary | ICD-10-CM

## 2025-07-25 LAB
GLUCOSE BLD-MCNC: 170 MG/DL (ref 70–99)
GLUCOSE BLD-MCNC: 260 MG/DL (ref 70–99)

## 2025-07-25 PROCEDURE — 99183 HYPERBARIC OXYGEN THERAPY: CPT | Performed by: NURSE PRACTITIONER

## 2025-07-25 NOTE — PROGRESS NOTES
Hyperbaric Daily Physician Note       Patient is here for Hyperbaric Oxygen therapy.     VITALS:      7/24/2025     9:07 AM 7/25/2025     7:00 AM   Vitals History   /77 155/84   Pulse 80 85   Resp 12 16   Temp 97.5 °F (36.4 °C) 98.6 °F (37 °C)       Lab Results   Component Value Date    PGLU 170 (H) 07/25/2025    PGLU 260 (H) 07/25/2025    PGLU 163 (H) 07/24/2025    PGLU 158 (H) 07/23/2025     Vitals:  See HBO tech note/flowsheets for vitals pre/post    DIAGNOSIS:   1. Radiation cystitis    2. 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 Controlled type 2 diabetes mellitus without complication, without long-term current use of insulin (HCC)        Pre/Post Treatment Evaluation      Pre-Treatment Lung Evaluation: Clear to auscultation bilaterally    Left Ear Pre-Treatment Evaluation  Left Ear Clear: Yes  Left Ear Intact: Yes  Left Cerumen Removal: No  Pre Left teed stgstrstastdstest:st st1st grade    Right Ear Pre-Treatment Evaluation  Right Ear Clear: Yes  Right Ear Intact: Yes  Right Cerumen Removal: No  Pre Right teed stgstrstastdstest:st st1st grade      Post-Treatment Lung Evaluation: Clear to auscultation bilaterally    Left Ear Post-Treatment Evaluation  Left Ear Clear: Yes  Left Ear Intact: Yes  Left Cerumen Removal: No  Post Left teed stgstrstastdstest:st st1st grade    Right Ear Post-Treatment Evaluation  Right Ear Clear: Yes  Right Ear Intact: Yes  Right Cerumen Removal: No  Post Right teed stgstrstastdstest:st st1st grade      I supervised this Hyperbaric treatment and was immediately available throughout the course of the treatment with a hospital approved hyperbaric physician available by phone.  In addition, there is a trained emergency support team and ICU available at this facility to assist with complications.         YVON Holloway  7/25/2025  7:12 AM

## 2025-07-25 NOTE — PROGRESS NOTES
HBO Treatment Details      Patient Name:    Daryl Arana  MRN:  [id number]        YOB: 1955  Today's Date:  2025  Physician/Provider: YVON Holloway  Facility: [place] [facility]  Diagnosis:   1. Radiation cystitis    2. 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 Controlled type 2 diabetes mellitus without complication, without long-term current use of insulin (HCC)            Treatment Course Number: 8  Total Treatments Ordered:  45  Ordering Physician:   Indication: Soft tissue radionecrosis  Risk Factors: Barotrauma, Hypoglycemia, Seizure  HBO Treatment Start Date:   HBO Treatment Number:  8  Treatment Length:120 Minutes  Treatment Depth: 2.5 DONATO  HBO Treatment End Date:   HBO Discharge Outcome: Treatment Complete        HBO Treatment Details:  In-Patient Visit: no  Chamber Type:  Monoplace  Chamber #: 1  HBO Dive Lo  Treatment Protocol:  2.5 DONATO with 10 minute air break      Treatment Details:  Pressurized Rate: 2.0 psi/min  Dive Rate Down:  2.0 psi/min  Dive Rate Up:  2.0 psi/min  Started Compression: 0705  Reached Compression: 0715  Patient on Air: 0800  Patient Taken off Air:0810  Total Compression Time: 10 (Minutes)  Total Holding Time: 100 (Minutes)  Started Decompression: 0855  Reached Surface: 0715  Total Decompression Time: 10 (Minutes)  Total Airbreaks: 10 (Minutes)  Total Time of Treatment: 110 (Minutes)                         Vital Signs:  HBO Glucose: pre-260, post-170 - (Reference Range: 100-350 mg/dl)        Vitals:    25 0700 25 0908   BP: 155/84 (!) 166/81   Pulse: 85 64   Resp: 16 14   Temp: 98.6 °F (37 °C) 97.4 °F (36.3 °C)       Treatment Response:  Symptoms Noted During Treatment: None   Treatment Aborted:  no      Treatment Notes:  Patient treated in hard sided chamber.

## 2025-07-28 ENCOUNTER — HBO THERAPY VISIT (OUTPATIENT)
Dept: WOUND CARE | Facility: HOSPITAL | Age: 70
End: 2025-07-28
Attending: INTERNAL MEDICINE
Payer: MEDICARE

## 2025-07-28 VITALS
HEART RATE: 69 BPM | SYSTOLIC BLOOD PRESSURE: 153 MMHG | TEMPERATURE: 98 F | DIASTOLIC BLOOD PRESSURE: 93 MMHG | RESPIRATION RATE: 14 BRPM

## 2025-07-28 DIAGNOSIS — N30.40 RADIATION CYSTITIS: Primary | ICD-10-CM

## 2025-07-28 DIAGNOSIS — K62.7 RADIATION PROCTITIS: ICD-10-CM

## 2025-07-28 DIAGNOSIS — N30.41 HEMATURIA DUE TO IRRADIATION CYSTITIS: ICD-10-CM

## 2025-07-28 LAB
GLUCOSE BLD-MCNC: 134 MG/DL (ref 70–99)
GLUCOSE BLD-MCNC: 177 MG/DL (ref 70–99)
GLUCOSE BLD-MCNC: 284 MG/DL (ref 70–99)

## 2025-07-28 PROCEDURE — 82962 GLUCOSE BLOOD TEST: CPT | Performed by: INTERNAL MEDICINE

## 2025-07-28 NOTE — PROGRESS NOTES
HBO Treatment Details      Patient Name:    Daryl Arana  MRN:  [id number]        YOB: 1955  Today's Date:  2025  Physician/Provider: Marie Delgado MD  Facility: [facility]  Diagnosis: No diagnosis found.        Treatment Course Number: 9  Total Treatments Ordered:  45  Ordering Physician:   Indication: Soft tissue radionecrosis  Risk Factors: Barotrauma, Hypoglycemia, Seizure  HBO Treatment Start Date:   HBO Treatment Number:  9  Treatment Length:120 Minutes  Treatment Depth: 2.5 DONATO  HBO Treatment End Date:   HBO Discharge Outcome: Treatment Complete        HBO Treatment Details:  In-Patient Visit: no  Chamber Type:  Monoplace  Chamber #: 1  HBO Dive Lo  Treatment Protocol:  2.5 DONATO with 10 minute air break      Treatment Details:  Pressurized Rate: 2.0 psi/min  Dive Rate Down:  2.0 psi/min  Dive Rate Up:  2.0 psi/min  Started Compression: 0731  Reached Compression: 0741  Patient on Air: 0826  Patient Taken off Air:0836  Total Compression Time: 10 (Minutes)  Total Holding Time: 100 (Minutes)  Started Decompression: 0921  Reached Surface: 0741  Total Decompression Time: 10 (Minutes)  Total Airbreaks: 10 (Minutes)  Total Time of Treatment: 110 (Minutes)                         Vital Signs:  HBO Glucose: pre-177, post-134 - (Reference Range: 100-350 mg/dl)        Vitals:    25 0715 25 0935   BP: 146/78 (!) 153/93   Pulse: 84 69   Resp: 12 14   Temp: 98.1 °F (36.7 °C) 98.3 °F (36.8 °C)       Treatment Response:  Symptoms Noted During Treatment: None   Treatment Aborted:  no      Treatment Notes:  Patient treated in hard sided chamber.

## 2025-07-28 NOTE — PROGRESS NOTES
Hyperbaric Daily Physician Note       Patient is here for Hyperbaric Oxygen therapy.   No complaints today.   Tolerating HBO with no side effects.     Blood Sugar:  Within normal limits.  Lab Results   Component Value Date    PGLU 134 (H) 07/28/2025    PGLU 177 (H) 07/28/2025    PGLU 170 (H) 07/25/2025    PGLU 260 (H) 07/25/2025       Vitals:      7/28/2025     7:15 AM 7/28/2025     9:35 AM   Vitals History   /78 153/93   Pulse 84 69   Resp 12 14   Temp 98.1 °F (36.7 °C) 98.3 °F (36.8 °C)        DIAGNOSIS:       ICD-10-CM    1. Radiation cystitis  N30.40       2. Radiation proctitis  K62.7       3. 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  N30.41           Pre/Post Treatment Evaluation      Pre-Treatment Lung Evaluation: Clear to auscultation bilaterally    Left Ear Pre-Treatment Evaluation  Left Ear Clear: Yes  Left Ear Intact: Yes  Left Cerumen Removal: No  Pre Left teed stgstrstastdstest:st st1st grade    Right Ear Pre-Treatment Evaluation  Right Ear Clear: Yes  Right Ear Intact: Yes  Right Cerumen Removal: No  Pre Right teed stgstrstastdstest:st st1st grade      Post-Treatment Lung Evaluation: Clear to auscultation bilaterally    Left Ear Post-Treatment Evaluation  Left Ear Clear: Yes  Left Ear Intact: Yes  Left Cerumen Removal: No  Post Left teed stgstrstastdstest:st st1st grade    Right Ear Post-Treatment Evaluation  Right Ear Clear: Yes  Right Ear Intact: Yes  Right Cerumen Removal: No  Post Right teed stgstrstastdstest:st st1st grade      I supervised this Hyperbaric treatment and was immediately available throughout the course of the treatment.  There is a trained emergency support team and ICU available at this facility to assist with complications.      Return for HBO treatments as scheduled.      Marie Delgado MD  7/28/2025  10:32 AM

## 2025-07-29 ENCOUNTER — HBO THERAPY VISIT (OUTPATIENT)
Dept: WOUND CARE | Facility: HOSPITAL | Age: 70
End: 2025-07-29
Attending: INTERNAL MEDICINE
Payer: MEDICARE

## 2025-07-29 VITALS
SYSTOLIC BLOOD PRESSURE: 169 MMHG | RESPIRATION RATE: 14 BRPM | HEART RATE: 68 BPM | TEMPERATURE: 98 F | DIASTOLIC BLOOD PRESSURE: 81 MMHG

## 2025-07-29 DIAGNOSIS — N30.40 RADIATION CYSTITIS: Primary | ICD-10-CM

## 2025-07-29 DIAGNOSIS — K62.7 RADIATION PROCTITIS: ICD-10-CM

## 2025-07-29 DIAGNOSIS — E11.9 CONTROLLED TYPE 2 DIABETES MELLITUS WITHOUT COMPLICATION, WITHOUT LONG-TERM CURRENT USE OF INSULIN (HCC): ICD-10-CM

## 2025-07-29 DIAGNOSIS — N30.41 HEMATURIA DUE TO IRRADIATION CYSTITIS: ICD-10-CM

## 2025-07-29 LAB
GLUCOSE BLD-MCNC: 139 MG/DL (ref 70–99)
GLUCOSE BLD-MCNC: 172 MG/DL (ref 70–99)

## 2025-07-29 PROCEDURE — 99183 HYPERBARIC OXYGEN THERAPY: CPT | Performed by: NURSE PRACTITIONER

## 2025-07-30 ENCOUNTER — HBO THERAPY VISIT (OUTPATIENT)
Dept: WOUND CARE | Facility: HOSPITAL | Age: 70
End: 2025-07-30
Attending: INTERNAL MEDICINE
Payer: MEDICARE

## 2025-07-30 VITALS
RESPIRATION RATE: 16 BRPM | HEART RATE: 67 BPM | SYSTOLIC BLOOD PRESSURE: 150 MMHG | DIASTOLIC BLOOD PRESSURE: 84 MMHG | TEMPERATURE: 98 F

## 2025-07-30 DIAGNOSIS — N30.41 HEMATURIA DUE TO IRRADIATION CYSTITIS: ICD-10-CM

## 2025-07-30 DIAGNOSIS — K62.7 RADIATION PROCTITIS: ICD-10-CM

## 2025-07-30 DIAGNOSIS — N30.40 RADIATION CYSTITIS: Primary | ICD-10-CM

## 2025-07-30 DIAGNOSIS — E11.9 CONTROLLED TYPE 2 DIABETES MELLITUS WITHOUT COMPLICATION, WITHOUT LONG-TERM CURRENT USE OF INSULIN (HCC): ICD-10-CM

## 2025-07-30 LAB
GLUCOSE BLD-MCNC: 172 MG/DL (ref 70–99)
GLUCOSE BLD-MCNC: 217 MG/DL (ref 70–99)

## 2025-07-30 PROCEDURE — 99183 HYPERBARIC OXYGEN THERAPY: CPT | Performed by: NURSE PRACTITIONER

## 2025-07-31 ENCOUNTER — HBO THERAPY VISIT (OUTPATIENT)
Dept: WOUND CARE | Facility: HOSPITAL | Age: 70
End: 2025-07-31
Attending: INTERNAL MEDICINE
Payer: MEDICARE

## 2025-07-31 VITALS
HEART RATE: 109 BPM | SYSTOLIC BLOOD PRESSURE: 159 MMHG | TEMPERATURE: 98 F | RESPIRATION RATE: 16 BRPM | DIASTOLIC BLOOD PRESSURE: 105 MMHG

## 2025-07-31 DIAGNOSIS — N30.40 RADIATION CYSTITIS: Primary | ICD-10-CM

## 2025-07-31 DIAGNOSIS — E11.9 CONTROLLED TYPE 2 DIABETES MELLITUS WITHOUT COMPLICATION, WITHOUT LONG-TERM CURRENT USE OF INSULIN (HCC): ICD-10-CM

## 2025-07-31 DIAGNOSIS — K62.7 RADIATION PROCTITIS: ICD-10-CM

## 2025-07-31 DIAGNOSIS — N30.41 HEMATURIA DUE TO IRRADIATION CYSTITIS: ICD-10-CM

## 2025-07-31 LAB
GLUCOSE BLD-MCNC: 162 MG/DL (ref 70–99)
GLUCOSE BLD-MCNC: 247 MG/DL (ref 70–99)

## 2025-07-31 PROCEDURE — 99183 HYPERBARIC OXYGEN THERAPY: CPT | Performed by: NURSE PRACTITIONER

## 2025-08-01 ENCOUNTER — HBO THERAPY VISIT (OUTPATIENT)
Dept: WOUND CARE | Facility: HOSPITAL | Age: 70
End: 2025-08-01
Attending: INTERNAL MEDICINE

## 2025-08-01 VITALS
RESPIRATION RATE: 12 BRPM | DIASTOLIC BLOOD PRESSURE: 76 MMHG | SYSTOLIC BLOOD PRESSURE: 142 MMHG | TEMPERATURE: 98 F | HEART RATE: 68 BPM

## 2025-08-01 DIAGNOSIS — N30.41 HEMATURIA DUE TO IRRADIATION CYSTITIS: ICD-10-CM

## 2025-08-01 DIAGNOSIS — N30.40 RADIATION CYSTITIS: Primary | ICD-10-CM

## 2025-08-01 DIAGNOSIS — E11.9 CONTROLLED TYPE 2 DIABETES MELLITUS WITHOUT COMPLICATION, WITHOUT LONG-TERM CURRENT USE OF INSULIN (HCC): ICD-10-CM

## 2025-08-01 DIAGNOSIS — K62.7 RADIATION PROCTITIS: ICD-10-CM

## 2025-08-01 LAB
GLUCOSE BLD-MCNC: 159 MG/DL (ref 70–99)
GLUCOSE BLD-MCNC: 237 MG/DL (ref 70–99)

## 2025-08-01 PROCEDURE — 99183 HYPERBARIC OXYGEN THERAPY: CPT | Performed by: NURSE PRACTITIONER

## 2025-08-04 ENCOUNTER — HBO THERAPY VISIT (OUTPATIENT)
Dept: WOUND CARE | Facility: HOSPITAL | Age: 70
End: 2025-08-04
Attending: INTERNAL MEDICINE

## 2025-08-04 VITALS
HEART RATE: 69 BPM | DIASTOLIC BLOOD PRESSURE: 81 MMHG | SYSTOLIC BLOOD PRESSURE: 163 MMHG | RESPIRATION RATE: 12 BRPM | TEMPERATURE: 98 F

## 2025-08-04 DIAGNOSIS — N30.41 HEMATURIA DUE TO IRRADIATION CYSTITIS: ICD-10-CM

## 2025-08-04 DIAGNOSIS — K62.7 RADIATION PROCTITIS: ICD-10-CM

## 2025-08-04 DIAGNOSIS — N30.40 RADIATION CYSTITIS: Primary | ICD-10-CM

## 2025-08-04 LAB
GLUCOSE BLD-MCNC: 134 MG/DL (ref 70–99)
GLUCOSE BLD-MCNC: 275 MG/DL (ref 70–99)

## 2025-08-04 PROCEDURE — 82962 GLUCOSE BLOOD TEST: CPT | Performed by: INTERNAL MEDICINE

## 2025-08-05 ENCOUNTER — HBO THERAPY VISIT (OUTPATIENT)
Dept: WOUND CARE | Facility: HOSPITAL | Age: 70
End: 2025-08-05
Attending: INTERNAL MEDICINE

## 2025-08-05 VITALS
DIASTOLIC BLOOD PRESSURE: 79 MMHG | SYSTOLIC BLOOD PRESSURE: 174 MMHG | RESPIRATION RATE: 10 BRPM | TEMPERATURE: 98 F | HEART RATE: 74 BPM

## 2025-08-05 DIAGNOSIS — K62.7 RADIATION PROCTITIS: ICD-10-CM

## 2025-08-05 DIAGNOSIS — N30.41 HEMATURIA DUE TO IRRADIATION CYSTITIS: ICD-10-CM

## 2025-08-05 DIAGNOSIS — N30.40 RADIATION CYSTITIS: Primary | ICD-10-CM

## 2025-08-05 DIAGNOSIS — E11.9 CONTROLLED TYPE 2 DIABETES MELLITUS WITHOUT COMPLICATION, WITHOUT LONG-TERM CURRENT USE OF INSULIN (HCC): ICD-10-CM

## 2025-08-05 LAB
GLUCOSE BLD-MCNC: 144 MG/DL (ref 70–99)
GLUCOSE BLD-MCNC: 226 MG/DL (ref 70–99)

## 2025-08-05 PROCEDURE — 99183 HYPERBARIC OXYGEN THERAPY: CPT | Performed by: NURSE PRACTITIONER

## 2025-08-06 ENCOUNTER — HBO THERAPY VISIT (OUTPATIENT)
Dept: WOUND CARE | Facility: HOSPITAL | Age: 70
End: 2025-08-06
Attending: INTERNAL MEDICINE

## 2025-08-06 VITALS
DIASTOLIC BLOOD PRESSURE: 82 MMHG | SYSTOLIC BLOOD PRESSURE: 159 MMHG | RESPIRATION RATE: 12 BRPM | HEART RATE: 66 BPM | TEMPERATURE: 97 F

## 2025-08-06 DIAGNOSIS — N30.41 HEMATURIA DUE TO IRRADIATION CYSTITIS: ICD-10-CM

## 2025-08-06 DIAGNOSIS — K62.7 RADIATION PROCTITIS: ICD-10-CM

## 2025-08-06 DIAGNOSIS — N30.40 RADIATION CYSTITIS: Primary | ICD-10-CM

## 2025-08-06 DIAGNOSIS — E11.9 CONTROLLED TYPE 2 DIABETES MELLITUS WITHOUT COMPLICATION, WITHOUT LONG-TERM CURRENT USE OF INSULIN (HCC): ICD-10-CM

## 2025-08-06 LAB — GLUCOSE BLD-MCNC: 143 MG/DL (ref 70–99)

## 2025-08-06 PROCEDURE — 99183 HYPERBARIC OXYGEN THERAPY: CPT | Performed by: NURSE PRACTITIONER

## 2025-08-07 ENCOUNTER — HBO THERAPY VISIT (OUTPATIENT)
Dept: WOUND CARE | Facility: HOSPITAL | Age: 70
End: 2025-08-07
Attending: INTERNAL MEDICINE

## 2025-08-07 VITALS
HEART RATE: 74 BPM | DIASTOLIC BLOOD PRESSURE: 82 MMHG | SYSTOLIC BLOOD PRESSURE: 162 MMHG | RESPIRATION RATE: 12 BRPM | TEMPERATURE: 98 F

## 2025-08-07 DIAGNOSIS — K62.7 RADIATION PROCTITIS: ICD-10-CM

## 2025-08-07 DIAGNOSIS — E11.9 CONTROLLED TYPE 2 DIABETES MELLITUS WITHOUT COMPLICATION, WITHOUT LONG-TERM CURRENT USE OF INSULIN (HCC): ICD-10-CM

## 2025-08-07 DIAGNOSIS — N30.40 RADIATION CYSTITIS: Primary | ICD-10-CM

## 2025-08-07 DIAGNOSIS — N30.41 HEMATURIA DUE TO IRRADIATION CYSTITIS: ICD-10-CM

## 2025-08-07 LAB
GLUCOSE BLD-MCNC: 149 MG/DL (ref 70–99)
GLUCOSE BLD-MCNC: 223 MG/DL (ref 70–99)
GLUCOSE BLD-MCNC: 251 MG/DL (ref 70–99)

## 2025-08-07 PROCEDURE — 99183 HYPERBARIC OXYGEN THERAPY: CPT | Performed by: NURSE PRACTITIONER

## 2025-08-08 ENCOUNTER — HBO THERAPY VISIT (OUTPATIENT)
Dept: WOUND CARE | Facility: HOSPITAL | Age: 70
End: 2025-08-08
Attending: INTERNAL MEDICINE

## 2025-08-08 VITALS
RESPIRATION RATE: 14 BRPM | DIASTOLIC BLOOD PRESSURE: 81 MMHG | TEMPERATURE: 98 F | SYSTOLIC BLOOD PRESSURE: 175 MMHG | HEART RATE: 76 BPM

## 2025-08-08 DIAGNOSIS — N30.41 HEMATURIA DUE TO IRRADIATION CYSTITIS: ICD-10-CM

## 2025-08-08 DIAGNOSIS — N30.40 RADIATION CYSTITIS: Primary | ICD-10-CM

## 2025-08-08 DIAGNOSIS — K62.7 RADIATION PROCTITIS: ICD-10-CM

## 2025-08-08 DIAGNOSIS — E11.9 CONTROLLED TYPE 2 DIABETES MELLITUS WITHOUT COMPLICATION, WITHOUT LONG-TERM CURRENT USE OF INSULIN (HCC): ICD-10-CM

## 2025-08-08 LAB
GLUCOSE BLD-MCNC: 184 MG/DL (ref 70–99)
GLUCOSE BLD-MCNC: 236 MG/DL (ref 70–99)

## 2025-08-08 PROCEDURE — 99183 HYPERBARIC OXYGEN THERAPY: CPT | Performed by: NURSE PRACTITIONER

## 2025-08-11 ENCOUNTER — HBO THERAPY VISIT (OUTPATIENT)
Dept: WOUND CARE | Facility: HOSPITAL | Age: 70
End: 2025-08-11
Attending: INTERNAL MEDICINE

## 2025-08-11 VITALS
DIASTOLIC BLOOD PRESSURE: 70 MMHG | RESPIRATION RATE: 14 BRPM | TEMPERATURE: 98 F | HEART RATE: 71 BPM | SYSTOLIC BLOOD PRESSURE: 168 MMHG

## 2025-08-11 DIAGNOSIS — N30.41 HEMATURIA DUE TO IRRADIATION CYSTITIS: ICD-10-CM
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
## 2025-08-11 DIAGNOSIS — L59.8 SOFT TISSUE RADIONECROSIS: Primary | ICD-10-CM

## 2025-08-11 DIAGNOSIS — K62.7 RADIATION PROCTITIS: ICD-10-CM

## 2025-08-11 DIAGNOSIS — N30.40 RADIATION CYSTITIS: ICD-10-CM

## 2025-08-11 LAB
GLUCOSE BLD-MCNC: 151 MG/DL (ref 70–99)
GLUCOSE BLD-MCNC: 263 MG/DL (ref 70–99)

## 2025-08-11 PROCEDURE — 82962 GLUCOSE BLOOD TEST: CPT | Performed by: INTERNAL MEDICINE

## 2025-08-12 ENCOUNTER — HBO THERAPY VISIT (OUTPATIENT)
Dept: WOUND CARE | Facility: HOSPITAL | Age: 70
End: 2025-08-12
Attending: INTERNAL MEDICINE

## 2025-08-12 ENCOUNTER — APPOINTMENT (OUTPATIENT)
Dept: WOUND CARE | Facility: HOSPITAL | Age: 70
End: 2025-08-12
Attending: INTERNAL MEDICINE

## 2025-08-12 VITALS
TEMPERATURE: 98 F | HEART RATE: 68 BPM | DIASTOLIC BLOOD PRESSURE: 85 MMHG | SYSTOLIC BLOOD PRESSURE: 163 MMHG | RESPIRATION RATE: 12 BRPM

## 2025-08-12 DIAGNOSIS — N30.41 HEMATURIA DUE TO IRRADIATION CYSTITIS: ICD-10-CM

## 2025-08-12 DIAGNOSIS — E11.9 CONTROLLED TYPE 2 DIABETES MELLITUS WITHOUT COMPLICATION, WITHOUT LONG-TERM CURRENT USE OF INSULIN (HCC): ICD-10-CM
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
## 2025-08-12 DIAGNOSIS — N30.40 RADIATION CYSTITIS: ICD-10-CM

## 2025-08-12 DIAGNOSIS — L59.8 SOFT TISSUE RADIONECROSIS: Primary | ICD-10-CM

## 2025-08-12 DIAGNOSIS — K62.7 RADIATION PROCTITIS: ICD-10-CM

## 2025-08-12 LAB
GLUCOSE BLD-MCNC: 167 MG/DL (ref 70–99)
GLUCOSE BLD-MCNC: 236 MG/DL (ref 70–99)

## 2025-08-12 PROCEDURE — 99183 HYPERBARIC OXYGEN THERAPY: CPT | Performed by: NURSE PRACTITIONER

## 2025-08-13 ENCOUNTER — HBO THERAPY VISIT (OUTPATIENT)
Dept: WOUND CARE | Facility: HOSPITAL | Age: 70
End: 2025-08-13
Attending: INTERNAL MEDICINE

## 2025-08-13 ENCOUNTER — APPOINTMENT (OUTPATIENT)
Dept: WOUND CARE | Facility: HOSPITAL | Age: 70
End: 2025-08-13
Attending: INTERNAL MEDICINE

## 2025-08-13 VITALS
TEMPERATURE: 97 F | SYSTOLIC BLOOD PRESSURE: 167 MMHG | HEART RATE: 71 BPM | DIASTOLIC BLOOD PRESSURE: 84 MMHG | RESPIRATION RATE: 12 BRPM
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
## 2025-08-13 DIAGNOSIS — L59.8 SOFT TISSUE RADIONECROSIS: Primary | ICD-10-CM

## 2025-08-13 DIAGNOSIS — E11.9 CONTROLLED TYPE 2 DIABETES MELLITUS WITHOUT COMPLICATION, WITHOUT LONG-TERM CURRENT USE OF INSULIN (HCC): ICD-10-CM

## 2025-08-13 DIAGNOSIS — N30.41 HEMATURIA DUE TO IRRADIATION CYSTITIS: ICD-10-CM

## 2025-08-13 DIAGNOSIS — N30.40 RADIATION CYSTITIS: ICD-10-CM

## 2025-08-13 DIAGNOSIS — K62.7 RADIATION PROCTITIS: ICD-10-CM

## 2025-08-13 LAB
GLUCOSE BLD-MCNC: 142 MG/DL (ref 70–99)
GLUCOSE BLD-MCNC: 252 MG/DL (ref 70–99)

## 2025-08-13 PROCEDURE — 99183 HYPERBARIC OXYGEN THERAPY: CPT | Performed by: NURSE PRACTITIONER

## 2025-08-14 ENCOUNTER — APPOINTMENT (OUTPATIENT)
Dept: WOUND CARE | Facility: HOSPITAL | Age: 70
End: 2025-08-14
Attending: INTERNAL MEDICINE

## 2025-08-14 ENCOUNTER — HBO THERAPY VISIT (OUTPATIENT)
Dept: WOUND CARE | Facility: HOSPITAL | Age: 70
End: 2025-08-14
Attending: INTERNAL MEDICINE

## 2025-08-14 VITALS
SYSTOLIC BLOOD PRESSURE: 170 MMHG | TEMPERATURE: 98 F | DIASTOLIC BLOOD PRESSURE: 86 MMHG | RESPIRATION RATE: 12 BRPM | HEART RATE: 68 BPM

## 2025-08-14 DIAGNOSIS — L59.8 SOFT TISSUE RADIONECROSIS: Primary | ICD-10-CM

## 2025-08-14 DIAGNOSIS — K62.7 RADIATION PROCTITIS: ICD-10-CM

## 2025-08-14 DIAGNOSIS — N30.41 HEMATURIA DUE TO IRRADIATION CYSTITIS: ICD-10-CM

## 2025-08-14 DIAGNOSIS — E11.9 CONTROLLED TYPE 2 DIABETES MELLITUS WITHOUT COMPLICATION, WITHOUT LONG-TERM CURRENT USE OF INSULIN (HCC): ICD-10-CM
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
## 2025-08-14 DIAGNOSIS — N30.40 RADIATION CYSTITIS: ICD-10-CM

## 2025-08-14 LAB
GLUCOSE BLD-MCNC: 157 MG/DL (ref 70–99)
GLUCOSE BLD-MCNC: 263 MG/DL (ref 70–99)

## 2025-08-14 PROCEDURE — 99183 HYPERBARIC OXYGEN THERAPY: CPT | Performed by: NURSE PRACTITIONER

## 2025-08-15 ENCOUNTER — APPOINTMENT (OUTPATIENT)
Dept: WOUND CARE | Facility: HOSPITAL | Age: 70
End: 2025-08-15
Attending: INTERNAL MEDICINE

## 2025-08-15 ENCOUNTER — HBO THERAPY VISIT (OUTPATIENT)
Dept: WOUND CARE | Facility: HOSPITAL | Age: 70
End: 2025-08-15
Attending: INTERNAL MEDICINE

## 2025-08-15 VITALS
SYSTOLIC BLOOD PRESSURE: 176 MMHG | DIASTOLIC BLOOD PRESSURE: 90 MMHG | TEMPERATURE: 98 F | HEART RATE: 76 BPM | RESPIRATION RATE: 12 BRPM

## 2025-08-15 DIAGNOSIS — L59.8 SOFT TISSUE RADIONECROSIS: Primary | ICD-10-CM

## 2025-08-15 DIAGNOSIS — N30.40 RADIATION CYSTITIS: ICD-10-CM

## 2025-08-15 DIAGNOSIS — E11.9 CONTROLLED TYPE 2 DIABETES MELLITUS WITHOUT COMPLICATION, WITHOUT LONG-TERM CURRENT USE OF INSULIN (HCC): ICD-10-CM

## 2025-08-15 DIAGNOSIS — K62.7 RADIATION PROCTITIS: ICD-10-CM
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
## 2025-08-15 DIAGNOSIS — N30.41 HEMATURIA DUE TO IRRADIATION CYSTITIS: ICD-10-CM

## 2025-08-15 LAB
GLUCOSE BLD-MCNC: 139 MG/DL (ref 70–99)
GLUCOSE BLD-MCNC: 215 MG/DL (ref 70–99)

## 2025-08-15 PROCEDURE — 99183 HYPERBARIC OXYGEN THERAPY: CPT | Performed by: NURSE PRACTITIONER

## 2025-08-18 ENCOUNTER — APPOINTMENT (OUTPATIENT)
Dept: WOUND CARE | Facility: HOSPITAL | Age: 70
End: 2025-08-18
Attending: INTERNAL MEDICINE

## 2025-08-18 ENCOUNTER — HBO THERAPY VISIT (OUTPATIENT)
Dept: WOUND CARE | Facility: HOSPITAL | Age: 70
End: 2025-08-18
Attending: INTERNAL MEDICINE

## 2025-08-18 VITALS
RESPIRATION RATE: 12 BRPM | SYSTOLIC BLOOD PRESSURE: 167 MMHG | DIASTOLIC BLOOD PRESSURE: 78 MMHG | TEMPERATURE: 97 F | HEART RATE: 69 BPM

## 2025-08-18 DIAGNOSIS — L59.8 SOFT TISSUE RADIONECROSIS: Primary | ICD-10-CM

## 2025-08-18 DIAGNOSIS — N30.40 RADIATION CYSTITIS: ICD-10-CM
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
## 2025-08-18 DIAGNOSIS — K62.7 RADIATION PROCTITIS: ICD-10-CM

## 2025-08-18 LAB
GLUCOSE BLD-MCNC: 173 MG/DL (ref 70–99)
GLUCOSE BLD-MCNC: 197 MG/DL (ref 70–99)

## 2025-08-18 PROCEDURE — 82962 GLUCOSE BLOOD TEST: CPT | Performed by: INTERNAL MEDICINE

## 2025-08-19 ENCOUNTER — APPOINTMENT (OUTPATIENT)
Dept: WOUND CARE | Facility: HOSPITAL | Age: 70
End: 2025-08-19
Attending: INTERNAL MEDICINE

## 2025-08-19 ENCOUNTER — HBO THERAPY VISIT (OUTPATIENT)
Dept: WOUND CARE | Facility: HOSPITAL | Age: 70
End: 2025-08-19
Attending: INTERNAL MEDICINE

## 2025-08-19 VITALS
DIASTOLIC BLOOD PRESSURE: 82 MMHG | RESPIRATION RATE: 10 BRPM | HEART RATE: 75 BPM | TEMPERATURE: 96 F | SYSTOLIC BLOOD PRESSURE: 189 MMHG

## 2025-08-19 DIAGNOSIS — L59.8 SOFT TISSUE RADIONECROSIS: Primary | ICD-10-CM
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
## 2025-08-19 DIAGNOSIS — N30.40 RADIATION CYSTITIS: ICD-10-CM

## 2025-08-19 DIAGNOSIS — K62.7 RADIATION PROCTITIS: ICD-10-CM

## 2025-08-19 DIAGNOSIS — E11.9 CONTROLLED TYPE 2 DIABETES MELLITUS WITHOUT COMPLICATION, WITHOUT LONG-TERM CURRENT USE OF INSULIN (HCC): ICD-10-CM

## 2025-08-19 LAB
GLUCOSE BLD-MCNC: 186 MG/DL (ref 70–99)
GLUCOSE BLD-MCNC: 285 MG/DL (ref 70–99)

## 2025-08-19 PROCEDURE — 99183 HYPERBARIC OXYGEN THERAPY: CPT | Performed by: NURSE PRACTITIONER

## 2025-08-20 ENCOUNTER — APPOINTMENT (OUTPATIENT)
Dept: WOUND CARE | Facility: HOSPITAL | Age: 70
End: 2025-08-20
Attending: INTERNAL MEDICINE

## 2025-08-20 ENCOUNTER — HBO THERAPY VISIT (OUTPATIENT)
Dept: WOUND CARE | Facility: HOSPITAL | Age: 70
End: 2025-08-20
Attending: INTERNAL MEDICINE

## 2025-08-20 VITALS
RESPIRATION RATE: 14 BRPM | HEART RATE: 78 BPM | DIASTOLIC BLOOD PRESSURE: 90 MMHG | SYSTOLIC BLOOD PRESSURE: 166 MMHG | TEMPERATURE: 98 F

## 2025-08-20 DIAGNOSIS — L59.8 SOFT TISSUE RADIONECROSIS: Primary | ICD-10-CM
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
## 2025-08-20 DIAGNOSIS — K62.7 RADIATION PROCTITIS: ICD-10-CM

## 2025-08-20 DIAGNOSIS — E11.9 CONTROLLED TYPE 2 DIABETES MELLITUS WITHOUT COMPLICATION, WITHOUT LONG-TERM CURRENT USE OF INSULIN (HCC): ICD-10-CM

## 2025-08-20 DIAGNOSIS — N30.40 RADIATION CYSTITIS: ICD-10-CM

## 2025-08-20 LAB — GLUCOSE BLD-MCNC: 174 MG/DL (ref 70–99)

## 2025-08-20 PROCEDURE — 99183 HYPERBARIC OXYGEN THERAPY: CPT | Performed by: NURSE PRACTITIONER

## 2025-08-21 ENCOUNTER — HBO THERAPY VISIT (OUTPATIENT)
Dept: WOUND CARE | Facility: HOSPITAL | Age: 70
End: 2025-08-21
Attending: INTERNAL MEDICINE

## 2025-08-21 ENCOUNTER — APPOINTMENT (OUTPATIENT)
Dept: WOUND CARE | Facility: HOSPITAL | Age: 70
End: 2025-08-21
Attending: INTERNAL MEDICINE

## 2025-08-21 VITALS
HEART RATE: 65 BPM | RESPIRATION RATE: 10 BRPM | SYSTOLIC BLOOD PRESSURE: 162 MMHG | DIASTOLIC BLOOD PRESSURE: 80 MMHG | TEMPERATURE: 98 F

## 2025-08-21 DIAGNOSIS — N30.41 HEMATURIA DUE TO IRRADIATION CYSTITIS: ICD-10-CM

## 2025-08-21 DIAGNOSIS — K62.7 RADIATION PROCTITIS: ICD-10-CM

## 2025-08-21 DIAGNOSIS — N30.40 RADIATION CYSTITIS: ICD-10-CM

## 2025-08-21 DIAGNOSIS — E11.9 CONTROLLED TYPE 2 DIABETES MELLITUS WITHOUT COMPLICATION, WITHOUT LONG-TERM CURRENT USE OF INSULIN (HCC): ICD-10-CM
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
## 2025-08-21 DIAGNOSIS — L59.8 SOFT TISSUE RADIONECROSIS: Primary | ICD-10-CM

## 2025-08-21 LAB
GLUCOSE BLD-MCNC: 154 MG/DL (ref 70–99)
GLUCOSE BLD-MCNC: 200 MG/DL (ref 70–99)
GLUCOSE BLD-MCNC: 260 MG/DL (ref 70–99)

## 2025-08-21 PROCEDURE — 99183 HYPERBARIC OXYGEN THERAPY: CPT | Performed by: NURSE PRACTITIONER

## 2025-08-22 ENCOUNTER — HBO THERAPY VISIT (OUTPATIENT)
Dept: WOUND CARE | Facility: HOSPITAL | Age: 70
End: 2025-08-22
Attending: INTERNAL MEDICINE

## 2025-08-22 ENCOUNTER — APPOINTMENT (OUTPATIENT)
Dept: WOUND CARE | Facility: HOSPITAL | Age: 70
End: 2025-08-22
Attending: INTERNAL MEDICINE

## 2025-08-22 VITALS
RESPIRATION RATE: 12 BRPM | TEMPERATURE: 98 F | HEART RATE: 66 BPM | DIASTOLIC BLOOD PRESSURE: 93 MMHG | SYSTOLIC BLOOD PRESSURE: 164 MMHG

## 2025-08-22 DIAGNOSIS — N30.40 RADIATION CYSTITIS: ICD-10-CM

## 2025-08-22 DIAGNOSIS — L59.8 SOFT TISSUE RADIONECROSIS: Primary | ICD-10-CM

## 2025-08-22 DIAGNOSIS — K62.7 RADIATION PROCTITIS: ICD-10-CM

## 2025-08-22 DIAGNOSIS — E11.9 CONTROLLED TYPE 2 DIABETES MELLITUS WITHOUT COMPLICATION, WITHOUT LONG-TERM CURRENT USE OF INSULIN (HCC): ICD-10-CM
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
## 2025-08-22 LAB
GLUCOSE BLD-MCNC: 171 MG/DL (ref 70–99)
GLUCOSE BLD-MCNC: 203 MG/DL (ref 70–99)

## 2025-08-22 PROCEDURE — 99183 HYPERBARIC OXYGEN THERAPY: CPT | Performed by: NURSE PRACTITIONER

## 2025-08-25 ENCOUNTER — APPOINTMENT (OUTPATIENT)
Dept: WOUND CARE | Facility: HOSPITAL | Age: 70
End: 2025-08-25
Attending: INTERNAL MEDICINE

## 2025-08-25 ENCOUNTER — HBO THERAPY VISIT (OUTPATIENT)
Dept: WOUND CARE | Facility: HOSPITAL | Age: 70
End: 2025-08-25
Attending: INTERNAL MEDICINE

## 2025-08-25 VITALS
RESPIRATION RATE: 14 BRPM | DIASTOLIC BLOOD PRESSURE: 83 MMHG | HEART RATE: 67 BPM | TEMPERATURE: 97 F | SYSTOLIC BLOOD PRESSURE: 155 MMHG

## 2025-08-25 DIAGNOSIS — N30.40 RADIATION CYSTITIS: ICD-10-CM

## 2025-08-25 DIAGNOSIS — K62.7 RADIATION PROCTITIS: ICD-10-CM
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
## 2025-08-25 DIAGNOSIS — L59.8 SOFT TISSUE RADIONECROSIS: Primary | ICD-10-CM

## 2025-08-25 LAB
GLUCOSE BLD-MCNC: 142 MG/DL (ref 70–99)
GLUCOSE BLD-MCNC: 276 MG/DL (ref 70–99)

## 2025-08-25 PROCEDURE — 82962 GLUCOSE BLOOD TEST: CPT | Performed by: INTERNAL MEDICINE

## 2025-08-26 ENCOUNTER — HBO THERAPY VISIT (OUTPATIENT)
Dept: WOUND CARE | Facility: HOSPITAL | Age: 70
End: 2025-08-26
Attending: INTERNAL MEDICINE

## 2025-08-26 ENCOUNTER — APPOINTMENT (OUTPATIENT)
Dept: WOUND CARE | Facility: HOSPITAL | Age: 70
End: 2025-08-26
Attending: INTERNAL MEDICINE

## 2025-08-26 VITALS
HEART RATE: 83 BPM | RESPIRATION RATE: 12 BRPM | DIASTOLIC BLOOD PRESSURE: 83 MMHG | SYSTOLIC BLOOD PRESSURE: 159 MMHG | TEMPERATURE: 98 F

## 2025-08-26 DIAGNOSIS — N30.40 RADIATION CYSTITIS: ICD-10-CM

## 2025-08-26 DIAGNOSIS — L59.8 SOFT TISSUE RADIONECROSIS: Primary | ICD-10-CM
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
## 2025-08-26 LAB
GLUCOSE BLD-MCNC: 144 MG/DL (ref 70–99)
GLUCOSE BLD-MCNC: 315 MG/DL (ref 70–99)

## 2025-08-26 PROCEDURE — 82962 GLUCOSE BLOOD TEST: CPT | Performed by: NURSE PRACTITIONER

## 2025-08-26 PROCEDURE — 82962 GLUCOSE BLOOD TEST: CPT | Performed by: FAMILY MEDICINE
